# Patient Record
Sex: FEMALE | Race: BLACK OR AFRICAN AMERICAN | Employment: UNEMPLOYED | URBAN - METROPOLITAN AREA
[De-identification: names, ages, dates, MRNs, and addresses within clinical notes are randomized per-mention and may not be internally consistent; named-entity substitution may affect disease eponyms.]

---

## 2023-10-05 ENCOUNTER — OFFICE VISIT (OUTPATIENT)
Age: 53
End: 2023-10-05
Payer: COMMERCIAL

## 2023-10-05 VITALS
DIASTOLIC BLOOD PRESSURE: 94 MMHG | HEART RATE: 90 BPM | TEMPERATURE: 97.7 F | WEIGHT: 227 LBS | BODY MASS INDEX: 41.77 KG/M2 | HEIGHT: 62 IN | SYSTOLIC BLOOD PRESSURE: 160 MMHG | RESPIRATION RATE: 20 BRPM | OXYGEN SATURATION: 96 %

## 2023-10-05 DIAGNOSIS — G40.909 SEIZURE DISORDER (HCC): Primary | ICD-10-CM

## 2023-10-05 DIAGNOSIS — F41.9 ANXIETY AND DEPRESSION: ICD-10-CM

## 2023-10-05 DIAGNOSIS — F32.A ANXIETY AND DEPRESSION: ICD-10-CM

## 2023-10-05 PROBLEM — F60.3 BORDERLINE PERSONALITY DISORDER (HCC): Status: ACTIVE | Noted: 2022-05-19

## 2023-10-05 PROBLEM — F41.1 GAD (GENERALIZED ANXIETY DISORDER): Status: ACTIVE | Noted: 2022-05-19

## 2023-10-05 PROBLEM — I10 ESSENTIAL HYPERTENSION: Status: ACTIVE | Noted: 2021-04-07

## 2023-10-05 PROBLEM — F43.12 CHRONIC POSTTRAUMATIC STRESS DISORDER: Status: ACTIVE | Noted: 2022-05-19

## 2023-10-05 PROBLEM — E78.00 HYPERCHOLESTEROLEMIA: Status: ACTIVE | Noted: 2023-06-28

## 2023-10-05 PROCEDURE — 99205 OFFICE O/P NEW HI 60 MIN: CPT | Performed by: PSYCHIATRY & NEUROLOGY

## 2023-10-05 PROCEDURE — 3080F DIAST BP >= 90 MM HG: CPT | Performed by: PSYCHIATRY & NEUROLOGY

## 2023-10-05 PROCEDURE — 3077F SYST BP >= 140 MM HG: CPT | Performed by: PSYCHIATRY & NEUROLOGY

## 2023-10-05 RX ORDER — ATORVASTATIN CALCIUM 40 MG/1
TABLET, FILM COATED ORAL
COMMUNITY
Start: 2020-09-04 | End: 2023-10-05

## 2023-10-05 RX ORDER — TRAZODONE HYDROCHLORIDE 50 MG/1
TABLET ORAL
COMMUNITY
Start: 2021-01-14 | End: 2023-10-05

## 2023-10-05 RX ORDER — HYDROCHLOROTHIAZIDE 25 MG/1
1 TABLET ORAL DAILY
COMMUNITY
Start: 2023-06-28

## 2023-10-05 RX ORDER — LAMOTRIGINE 100 MG/1
TABLET ORAL
COMMUNITY
Start: 2023-07-12

## 2023-10-05 RX ORDER — ALBUTEROL SULFATE 90 UG/1
2 AEROSOL, METERED RESPIRATORY (INHALATION) EVERY 6 HOURS PRN
COMMUNITY
Start: 2023-09-13 | End: 2023-10-13

## 2023-10-05 RX ORDER — IBUPROFEN 800 MG/1
800 TABLET ORAL
COMMUNITY
Start: 2023-06-28

## 2023-10-05 RX ORDER — PHENOBARBITAL 60 MG/1
1 TABLET ORAL EVERY 8 HOURS
COMMUNITY
End: 2023-10-05

## 2023-10-05 RX ORDER — ESCITALOPRAM OXALATE 20 MG/1
20 TABLET ORAL DAILY
Qty: 30 TABLET | Refills: 5 | COMMUNITY
Start: 2023-08-28 | End: 2024-02-24

## 2023-10-05 RX ORDER — FLUOXETINE HYDROCHLORIDE 40 MG/1
CAPSULE ORAL
COMMUNITY
End: 2023-10-05

## 2023-10-05 RX ORDER — PHENYTOIN SODIUM 200 MG/1
CAPSULE, EXTENDED RELEASE ORAL
COMMUNITY
Start: 2023-07-12 | End: 2023-10-05

## 2023-10-05 RX ORDER — MULTIVITAMIN/IRON/FOLIC ACID 18MG-0.4MG
TABLET ORAL
COMMUNITY
Start: 2021-04-16 | End: 2023-10-05

## 2023-10-05 RX ORDER — QUETIAPINE FUMARATE 400 MG/1
600 TABLET, FILM COATED ORAL NIGHTLY
Qty: 45 TABLET | Refills: 3 | COMMUNITY
Start: 2023-08-28 | End: 2023-12-26

## 2023-10-05 RX ORDER — SERTRALINE HYDROCHLORIDE 100 MG/1
TABLET, FILM COATED ORAL
COMMUNITY

## 2023-10-05 RX ORDER — ATORVASTATIN CALCIUM 10 MG/1
10 TABLET, FILM COATED ORAL NIGHTLY
COMMUNITY
Start: 2023-07-02

## 2023-10-05 RX ORDER — PRAZOSIN HYDROCHLORIDE 1 MG/1
1 CAPSULE ORAL NIGHTLY
Qty: 30 CAPSULE | Refills: 5 | COMMUNITY
Start: 2023-08-28 | End: 2024-02-24

## 2023-10-05 NOTE — PROGRESS NOTES
NEUROLOGY CLINIC NOTE    Patient ID:  Gumaro Trevino  [id number]  48 y.o.  1970    Date of Consultation:  October 5, 2023    Referring Physician: No ref. provider found     Reason for Consultation:  seizures    Chief Complaint   Patient presents with    New Patient       History of Present Illness:     Patient Active Problem List    Diagnosis Date Noted    Hypercholesterolemia 06/28/2023    Borderline personality disorder ([address]) 05/19/2022    Chronic posttraumatic stress disorder 05/19/2022    ALTAF (generalized anxiety disorder) 05/19/2022    Essential hypertension 04/07/2021    Obesity with body mass index 30 or greater 12/13/2016    Depressive disorder 02/24/2015    Persistent insomnia 02/24/2015    Neuralgia 11/26/2011    Grand mal status ([address] [facility]) 02/02/2011     No past medical history on file. No past surgical history on file. Prior to Admission medications    Medication Sig Start Date End Date Taking?  Authorizing Provider   escitalopram (LEXAPRO) 20 MG tablet Take 1 tablet by mouth daily 8/28/23 2/24/24 Yes ProviderClaudia MD   hydroCHLOROthiazide (HYDRODIURIL) 25 MG tablet Take 1 tablet by mouth daily 6/28/23  Yes Provider, MD Claudia   prazosin (MINIPRESS) 1 MG capsule Take 1 capsule by mouth nightly 8/28/23 2/24/24 Yes Provider, MD Claudia   QUEtiapine (SEROQUEL) 400 MG tablet Take 1.5 tablets by mouth nightly 8/28/23 12/26/23 Yes ProviderClaudia MD   lamoTRIgine (LAMICTAL) 100 MG tablet TAKE 1 TABLET BY MOUTH ONCE DAILY IN THE MORNING AND 2 AT NIGHT 7/12/23  Yes Provider, MD Claudia   ibuprofen (ADVIL;MOTRIN) 800 MG tablet Take 1 tablet by mouth 3 times daily (with meals) 6/28/23  Yes Provider, MD Claudia   albuterol sulfate HFA (PROVENTIL;VENTOLIN;PROAIR) 108 (90 Base) MCG/ACT inhaler Inhale 2 puffs into the lungs every 6 hours as needed 9/13/23 10/13/23 Yes ProviderClaudia MD   atorvastatin (LIPITOR) 10 MG tablet Take 1 tablet by mouth nightly 7/2/23  Yes Krunal

## 2023-10-11 ENCOUNTER — PROCEDURE VISIT (OUTPATIENT)
Age: 53
End: 2023-10-11
Payer: COMMERCIAL

## 2023-10-11 DIAGNOSIS — G40.909 SEIZURE DISORDER (HCC): Primary | ICD-10-CM

## 2023-10-11 PROCEDURE — 95819 EEG AWAKE AND ASLEEP: CPT | Performed by: PSYCHIATRY & NEUROLOGY

## 2023-10-16 NOTE — PROGRESS NOTES
as needed    atorvastatin (LIPITOR) 10 MG tablet Take 1 tablet by mouth nightly     No current facility-administered medications for this visit.